# Patient Record
Sex: FEMALE | Race: WHITE | NOT HISPANIC OR LATINO
[De-identification: names, ages, dates, MRNs, and addresses within clinical notes are randomized per-mention and may not be internally consistent; named-entity substitution may affect disease eponyms.]

---

## 2018-04-25 PROBLEM — Z00.00 ENCOUNTER FOR PREVENTIVE HEALTH EXAMINATION: Status: ACTIVE | Noted: 2018-04-25

## 2018-05-23 ENCOUNTER — RECORD ABSTRACTING (OUTPATIENT)
Age: 73
End: 2018-05-23

## 2018-05-23 DIAGNOSIS — Z86.39 PERSONAL HISTORY OF OTHER ENDOCRINE, NUTRITIONAL AND METABOLIC DISEASE: ICD-10-CM

## 2018-05-23 DIAGNOSIS — Z78.9 OTHER SPECIFIED HEALTH STATUS: ICD-10-CM

## 2018-05-23 DIAGNOSIS — Z87.39 PERSONAL HISTORY OF OTHER DISEASES OF THE MUSCULOSKELETAL SYSTEM AND CONNECTIVE TISSUE: ICD-10-CM

## 2018-05-23 RX ORDER — OMEPRAZOLE 20 MG/1
TABLET, DELAYED RELEASE ORAL
Refills: 0 | Status: ACTIVE | COMMUNITY

## 2018-07-03 ENCOUNTER — APPOINTMENT (OUTPATIENT)
Dept: BREAST CENTER | Facility: CLINIC | Age: 73
End: 2018-07-03
Payer: MEDICARE

## 2018-07-03 VITALS
BODY MASS INDEX: 28.73 KG/M2 | SYSTOLIC BLOOD PRESSURE: 115 MMHG | HEIGHT: 69 IN | WEIGHT: 194 LBS | HEART RATE: 84 BPM | DIASTOLIC BLOOD PRESSURE: 68 MMHG

## 2018-07-03 DIAGNOSIS — Z85.828 PERSONAL HISTORY OF OTHER MALIGNANT NEOPLASM OF SKIN: ICD-10-CM

## 2018-07-03 DIAGNOSIS — Z87.39 PERSONAL HISTORY OF OTHER DISEASES OF THE MUSCULOSKELETAL SYSTEM AND CONNECTIVE TISSUE: ICD-10-CM

## 2018-07-03 PROCEDURE — 99215 OFFICE O/P EST HI 40 MIN: CPT

## 2018-07-03 RX ORDER — DOXYCYCLINE HYCLATE 100 MG/1
100 TABLET ORAL
Qty: 14 | Refills: 0 | Status: DISCONTINUED | COMMUNITY
Start: 2018-05-23

## 2018-07-03 RX ORDER — ROSUVASTATIN CALCIUM 5 MG/1
TABLET, FILM COATED ORAL
Refills: 0 | Status: DISCONTINUED | COMMUNITY
End: 2018-07-03

## 2019-01-14 ENCOUNTER — APPOINTMENT (OUTPATIENT)
Dept: BREAST CENTER | Facility: CLINIC | Age: 74
End: 2019-01-14

## 2020-12-15 ENCOUNTER — APPOINTMENT (OUTPATIENT)
Dept: BREAST CENTER | Facility: CLINIC | Age: 75
End: 2020-12-15
Payer: MEDICARE

## 2020-12-15 VITALS
SYSTOLIC BLOOD PRESSURE: 119 MMHG | DIASTOLIC BLOOD PRESSURE: 81 MMHG | HEART RATE: 97 BPM | BODY MASS INDEX: 29.03 KG/M2 | HEIGHT: 69 IN | WEIGHT: 196 LBS

## 2020-12-15 DIAGNOSIS — N63.0 UNSPECIFIED LUMP IN UNSPECIFIED BREAST: ICD-10-CM

## 2020-12-15 DIAGNOSIS — Z80.0 FAMILY HISTORY OF MALIGNANT NEOPLASM OF DIGESTIVE ORGANS: ICD-10-CM

## 2020-12-15 PROCEDURE — 99215 OFFICE O/P EST HI 40 MIN: CPT

## 2020-12-15 RX ORDER — DICLOFENAC SODIUM 10 MG/G
1 GEL TOPICAL
Qty: 100 | Refills: 0 | Status: DISCONTINUED | COMMUNITY
Start: 2018-05-17 | End: 2020-12-15

## 2020-12-15 NOTE — PHYSICAL EXAM
[Normocephalic] : normocephalic [Atraumatic] : atraumatic [Supple] : supple [No Supraclavicular Adenopathy] : no supraclavicular adenopathy [Examined in the supine and seated position] : examined in the supine and seated position [No dominant masses] : no dominant masses in right breast  [No dominant masses] : no dominant masses left breast [No Nipple Retraction] : no left nipple retraction [No Nipple Discharge] : no left nipple discharge [No Axillary Lymphadenopathy] : no left axillary lymphadenopathy [No Edema] : no edema [No Rashes] : no rashes [No Ulceration] : no ulceration [de-identified] : in area patient's concern upper-outer quadrant there a palpable ridge of breast tissue, no suspicious changes

## 2020-12-15 NOTE — REVIEW OF SYSTEMS
[Joint Swelling] : joint swelling [Mid Back Pain] : mid back pain [Negative] : Heme/Lymph [FreeTextEntry2] : Back Pain

## 2020-12-15 NOTE — ASSESSMENT
[FreeTextEntry1] : 75 year-old female followed for fibrocystic disease and questionable palpable mass\par Due for bilateral mammogram now\par Followup with me in 1year\par We reviewed risk reduction strategies including maintaining a BMI <25, limiting red meat intake and alcoholic beverages to 3 per week and exercise (150 min/ week low intensity or 75 min/week high intensity). And maintaining a normal vitamin D level.\par \par She knows to call or return sooner should any concerns or questions arise.\par

## 2020-12-15 NOTE — HISTORY OF PRESENT ILLNESS
[FreeTextEntry1] : This is a 75 year old female referred for a palpable left breast mass which is radiographically occult.  The patient has no previous breast history. \par \par Patient has no breast complaints today.  She has a 2 year old grandson. She has gained about 8 pounds with covid.\par \par She does not perform SBE. \par She has not noticed a change in her breast or a breast lump.\par She has not noticed a change in her nipple or nipple area.\par She has not noticed a change in the skin of the breast.\par She is not experiencing nipple discharge.\par She is not experiencing breast pain.\par She has not noticed a lump or lymph node under the armpit. \par \par BREAST CANCER RISK FACTORS\par Menarche:  14\par Menopause: post approx 53 yo\par Grav:   3   Para:  3\par Age at first live birth: 21\par Nursed: yes\par Hysterectomy: no\par Oophorectomy: no \par OCP: yes 15 y\par HRT: no\par Last pap/pelvic exam: 2015 WNL\par Related family history:  Pat 1st cousin BCA @50, pat 1st cousin pancreatic cancer\par Ashkenazi: no\par Mastery risk assessment:  BRCAPRO 4.1%, TCv6 3.0%, TCv4 4.8%, [place] 3.9%, Zenon is not applicable\par BRCA testing: no\par Bra size:  42 D\par \par Last mammogram:   01/18/2019                        Location: [facility]\par Report reviewed.                                \par Results: Birads 2\par No evidence of malignancy \par \par Last ultrasound:    01/18/2019                   Location: [facility] \par Report reviewed.                                \par Results:Birads 2\par No evidence of malignancy \par \par Last MRI: 04/07/2017                       Location: [facility]\par Report reviewed.                                \par Results: Birads 2\par No evidence of malignancy\par

## 2021-12-14 ENCOUNTER — APPOINTMENT (OUTPATIENT)
Dept: BREAST CENTER | Facility: CLINIC | Age: 76
End: 2021-12-14

## 2022-09-12 ENCOUNTER — NON-APPOINTMENT (OUTPATIENT)
Age: 77
End: 2022-09-12

## 2022-09-12 ENCOUNTER — APPOINTMENT (OUTPATIENT)
Dept: BREAST CENTER | Facility: CLINIC | Age: 77
End: 2022-09-12

## 2022-09-12 VITALS
HEART RATE: 68 BPM | WEIGHT: 196 LBS | HEIGHT: 69 IN | BODY MASS INDEX: 29.03 KG/M2 | DIASTOLIC BLOOD PRESSURE: 72 MMHG | SYSTOLIC BLOOD PRESSURE: 128 MMHG

## 2022-09-12 DIAGNOSIS — N60.19 DIFFUSE CYSTIC MASTOPATHY OF UNSPECIFIED BREAST: ICD-10-CM

## 2022-09-12 DIAGNOSIS — Z12.31 ENCOUNTER FOR SCREENING MAMMOGRAM FOR MALIGNANT NEOPLASM OF BREAST: ICD-10-CM

## 2022-09-12 PROCEDURE — 99213 OFFICE O/P EST LOW 20 MIN: CPT

## 2022-09-12 NOTE — PHYSICAL EXAM
[Normocephalic] : normocephalic [Atraumatic] : atraumatic [Supple] : supple [No Supraclavicular Adenopathy] : no supraclavicular adenopathy [Examined in the supine and seated position] : examined in the supine and seated position [No dominant masses] : no dominant masses in right breast  [No dominant masses] : no dominant masses left breast [No Nipple Retraction] : no left nipple retraction [No Nipple Discharge] : no left nipple discharge [No Axillary Lymphadenopathy] : no left axillary lymphadenopathy [No Edema] : no edema [No Rashes] : no rashes [No Ulceration] : no ulceration [Symmetrical] : symmetrical [de-identified] : in area patient's concern upper-outer quadrant there a palpable ridge of breast tissue, no suspicious changes

## 2022-09-12 NOTE — ASSESSMENT
[FreeTextEntry1] : 77 year-old female followed for fibrocystic disease\par Due for bilateral mammogram/sono ASAP\par Followup with me in 1 year or prn\par We reviewed risk reduction strategies including maintaining a BMI <25, limiting red meat intake and alcoholic beverages to 3 per week and exercise (150 min/ week low intensity or 75 min/week high intensity). And maintaining a normal vitamin D level.\par \par She knows to call or return sooner should any concerns or questions arise.\par

## 2022-09-12 NOTE — HISTORY OF PRESENT ILLNESS
[FreeTextEntry1] : This is a 77 year old female referred for a palpable left breast mass which is radiographically occult.  The patient has no previous breast history. \par \par Patient has no breast complaints today. Patient denies any lumps/masses or pain in bilateral breasts. No nipple discharge bilateral breasts. 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 Patient was hospitalized for 10 days at [facility]. Patient had two surgeries on her left knee. Patient was on IV antibiotics for 3 months, followed by oral antibiotics for a total of 8 months. Patient's brother recently diagnosed with prostate cancer @72. Patient did not have any of her breast imaging done since last office visit. \par \par She does not perform SBE. \par She has not noticed a change in her breast or a breast lump.\par She has not noticed a change in her nipple or nipple area.\par She has not noticed a change in the skin of the breast.\par She is not experiencing nipple discharge.\par She is not experiencing breast pain.\par She has not noticed a lump or lymph node under the armpit. \par \par BREAST CANCER RISK FACTORS\par Menarche:  14\par Menopause: post approx 53 yo\par Grav:   3   Para:  3\par Age at first live birth: 21\par Nursed: yes\par Hysterectomy: no\par Oophorectomy: no \par OCP: yes 15 y\par HRT: no\par Last pap/pelvic exam: 2015 WNL\par Related family history:  Pat 1st cousin BCA @50, pat 1st cousin pancreatic cancer@70, brother with prostate cancer @72\par Ashkenazi: no\par Mastery risk assessment:  BRCAPRO 2.2%, TCv6 3.3%, TCv4 2.9%, [place] 2.7%, Zenon is not applicable\par BRCA testing: no\par Bra size:  42 D\par \par Last mammogram:   01/18/2019                        Location: [facility]\par Report reviewed.                                \par Results: Birads 2\par No evidence of malignancy \par \par Last ultrasound:    01/18/2019                   Location: [facility] \par Report reviewed.                                \par Results:Birads 2\par No evidence of malignancy \par \par Last MRI: 04/07/2017                       Location: [facility]\par Report reviewed.                                \par Results: Birads 2\par No evidence of malignancy\par

## 2023-01-18 ENCOUNTER — RESULT REVIEW (OUTPATIENT)
Age: 78
End: 2023-01-18

## 2025-02-10 ENCOUNTER — APPOINTMENT (OUTPATIENT)
Dept: BREAST CENTER | Facility: CLINIC | Age: 80
End: 2025-02-10
Payer: MEDICARE

## 2025-02-10 VITALS
HEART RATE: 82 BPM | WEIGHT: 210 LBS | DIASTOLIC BLOOD PRESSURE: 71 MMHG | SYSTOLIC BLOOD PRESSURE: 107 MMHG | HEIGHT: 68 IN | BODY MASS INDEX: 31.83 KG/M2

## 2025-02-10 DIAGNOSIS — R92.2 INCONCLUSIVE MAMMOGRAM: ICD-10-CM

## 2025-02-10 DIAGNOSIS — N60.19 DIFFUSE CYSTIC MASTOPATHY OF UNSPECIFIED BREAST: ICD-10-CM

## 2025-02-10 DIAGNOSIS — Z12.31 ENCOUNTER FOR SCREENING MAMMOGRAM FOR MALIGNANT NEOPLASM OF BREAST: ICD-10-CM

## 2025-02-10 PROCEDURE — 99214 OFFICE O/P EST MOD 30 MIN: CPT

## 2025-02-10 RX ORDER — ROSUVASTATIN CALCIUM 40 MG/1
40 TABLET, FILM COATED ORAL
Refills: 0 | Status: ACTIVE | COMMUNITY

## 2025-02-10 RX ORDER — EZETIMIBE 10 MG/1
10 TABLET ORAL
Refills: 0 | Status: ACTIVE | COMMUNITY

## 2025-02-26 ENCOUNTER — RESULT REVIEW (OUTPATIENT)
Age: 80
End: 2025-02-26

## 2025-02-28 ENCOUNTER — NON-APPOINTMENT (OUTPATIENT)
Age: 80
End: 2025-02-28

## 2025-03-03 ENCOUNTER — RESULT REVIEW (OUTPATIENT)
Age: 80
End: 2025-03-03

## 2025-03-03 DIAGNOSIS — R92.0 MAMMOGRAPHIC MICROCALCIFICATION FOUND ON DIAGNOSTIC IMAGING OF BREAST: ICD-10-CM

## 2025-04-04 ENCOUNTER — RESULT REVIEW (OUTPATIENT)
Age: 80
End: 2025-04-04

## 2025-04-14 ENCOUNTER — NON-APPOINTMENT (OUTPATIENT)
Age: 80
End: 2025-04-14